# Patient Record
Sex: FEMALE | Race: WHITE | NOT HISPANIC OR LATINO | URBAN - METROPOLITAN AREA
[De-identification: names, ages, dates, MRNs, and addresses within clinical notes are randomized per-mention and may not be internally consistent; named-entity substitution may affect disease eponyms.]

---

## 2024-01-02 ENCOUNTER — LAB REQUISITION (OUTPATIENT)
Dept: LAB | Facility: HOSPITAL | Age: 29
End: 2024-01-02

## 2024-01-02 LAB — SARS-COV-2 RNA RESP QL NAA+PROBE: NOT DETECTED

## 2024-01-02 PROCEDURE — 87635 SARS-COV-2 COVID-19 AMP PRB: CPT

## 2024-01-22 ENCOUNTER — APPOINTMENT (OUTPATIENT)
Dept: PRIMARY CARE | Facility: CLINIC | Age: 29
End: 2024-01-22
Payer: COMMERCIAL

## 2024-02-09 ENCOUNTER — OFFICE VISIT (OUTPATIENT)
Dept: PRIMARY CARE | Facility: CLINIC | Age: 29
End: 2024-02-09
Payer: COMMERCIAL

## 2024-02-09 VITALS
HEIGHT: 65 IN | BODY MASS INDEX: 28.16 KG/M2 | WEIGHT: 169 LBS | SYSTOLIC BLOOD PRESSURE: 132 MMHG | HEART RATE: 72 BPM | DIASTOLIC BLOOD PRESSURE: 74 MMHG | RESPIRATION RATE: 19 BRPM

## 2024-02-09 DIAGNOSIS — F41.1 GENERALIZED ANXIETY DISORDER: ICD-10-CM

## 2024-02-09 DIAGNOSIS — F42.2 MIXED OBSESSIONAL THOUGHTS AND ACTS: ICD-10-CM

## 2024-02-09 DIAGNOSIS — F39 EPISODIC MOOD DISORDER (CMS-HCC): ICD-10-CM

## 2024-02-09 DIAGNOSIS — Z00.00 HEALTH CARE MAINTENANCE: Primary | ICD-10-CM

## 2024-02-09 DIAGNOSIS — F34.81 DISRUPTIVE MOOD DYSREGULATION DISORDER (MULTI): ICD-10-CM

## 2024-02-09 DIAGNOSIS — F41.1 ANXIETY STATE: ICD-10-CM

## 2024-02-09 DIAGNOSIS — F91.3 OPPOSITIONAL DEFIANT DISORDER: ICD-10-CM

## 2024-02-09 DIAGNOSIS — F32.A DEPRESSIVE DISORDER: ICD-10-CM

## 2024-02-09 PROBLEM — N92.1 METRORRHAGIA: Status: RESOLVED | Noted: 2021-07-01 | Resolved: 2024-02-09

## 2024-02-09 PROBLEM — N92.1 METRORRHAGIA: Status: ACTIVE | Noted: 2021-07-01

## 2024-02-09 PROBLEM — N93.9 ABNORMAL UTERINE BLEEDING (AUB): Status: RESOLVED | Noted: 2020-10-01 | Resolved: 2024-02-09

## 2024-02-09 PROBLEM — N93.9 ABNORMAL UTERINE BLEEDING (AUB): Status: ACTIVE | Noted: 2020-10-01

## 2024-02-09 PROCEDURE — 99385 PREV VISIT NEW AGE 18-39: CPT

## 2024-02-09 RX ORDER — LAMOTRIGINE 200 MG/1
TABLET ORAL
COMMUNITY
Start: 2023-12-14 | End: 2024-03-26 | Stop reason: WASHOUT

## 2024-02-09 RX ORDER — ARMODAFINIL 150 MG/1
1 TABLET ORAL
COMMUNITY
Start: 2023-12-14

## 2024-02-09 RX ORDER — SERTRALINE HYDROCHLORIDE 100 MG/1
1 TABLET, FILM COATED ORAL DAILY
COMMUNITY
Start: 2018-01-09 | End: 2024-02-09 | Stop reason: WASHOUT

## 2024-02-09 RX ORDER — FLUOCINONIDE 0.5 MG/G
CREAM TOPICAL
COMMUNITY
Start: 2015-07-29 | End: 2024-02-09 | Stop reason: WASHOUT

## 2024-02-09 RX ORDER — RISPERIDONE 0.25 MG/1
0.25 TABLET ORAL
COMMUNITY
Start: 2018-01-09 | End: 2024-02-09 | Stop reason: WASHOUT

## 2024-02-09 RX ORDER — LAMOTRIGINE 150 MG/1
1 TABLET ORAL DAILY
COMMUNITY
Start: 2023-05-05 | End: 2024-02-09 | Stop reason: WASHOUT

## 2024-02-09 RX ORDER — SERTRALINE HYDROCHLORIDE 25 MG/1
1 TABLET, FILM COATED ORAL DAILY
COMMUNITY
Start: 2018-01-09 | End: 2024-02-09 | Stop reason: WASHOUT

## 2024-02-09 RX ORDER — HYDROXYZINE HYDROCHLORIDE 25 MG/1
TABLET, FILM COATED ORAL
COMMUNITY
Start: 2023-02-21 | End: 2024-02-09 | Stop reason: WASHOUT

## 2024-02-09 RX ORDER — LORAZEPAM 0.5 MG/1
TABLET ORAL
COMMUNITY
Start: 2023-03-29 | End: 2024-02-09 | Stop reason: WASHOUT

## 2024-02-09 ASSESSMENT — ENCOUNTER SYMPTOMS
NUMBNESS: 0
DYSURIA: 0
FREQUENCY: 0
LIGHT-HEADEDNESS: 0
HEADACHES: 0
APNEA: 0
CONSTITUTIONAL NEGATIVE: 1
BRUISES/BLEEDS EASILY: 0
COLOR CHANGE: 0
GASTROINTESTINAL NEGATIVE: 1
DIAPHORESIS: 0
CONFUSION: 0
NECK STIFFNESS: 0
NERVOUS/ANXIOUS: 1
MUSCULOSKELETAL NEGATIVE: 1
JOINT SWELLING: 0
DECREASED CONCENTRATION: 0
RESPIRATORY NEGATIVE: 1
APPETITE CHANGE: 0
SLEEP DISTURBANCE: 0
ENDOCRINE NEGATIVE: 1
FLANK PAIN: 0
BACK PAIN: 0
FATIGUE: 0
DIARRHEA: 0
RECTAL PAIN: 0
TROUBLE SWALLOWING: 0
VOICE CHANGE: 0
WHEEZING: 0
COUGH: 0
HALLUCINATIONS: 0
SORE THROAT: 0
RHINORRHEA: 0
FEVER: 0
AGITATION: 0
NEUROLOGICAL NEGATIVE: 1
EYES NEGATIVE: 1
EYE DISCHARGE: 0
SEIZURES: 0
CHILLS: 0
HEMATURIA: 0
ACTIVITY CHANGE: 0
PHOTOPHOBIA: 0
WEAKNESS: 0
PALPITATIONS: 0
DYSPHORIC MOOD: 0
SHORTNESS OF BREATH: 0
STRIDOR: 0
POLYDIPSIA: 0
HYPERACTIVE: 0
SINUS PRESSURE: 0
CONSTIPATION: 0
ABDOMINAL DISTENTION: 0
NAUSEA: 0
SPEECH DIFFICULTY: 0
NECK PAIN: 0
POLYPHAGIA: 0
MYALGIAS: 0
DIZZINESS: 0
UNEXPECTED WEIGHT CHANGE: 0
ANAL BLEEDING: 0
BLOOD IN STOOL: 0
TREMORS: 0
HEMATOLOGIC/LYMPHATIC NEGATIVE: 1
ABDOMINAL PAIN: 0
CARDIOVASCULAR NEGATIVE: 1
CHEST TIGHTNESS: 0
DIFFICULTY URINATING: 0

## 2024-02-09 NOTE — PROGRESS NOTES
Primary Care Provider: Jenn Guzman, APRN-CNP    Subjective   Carri Morenita Qureshi is a 28 y.o. female who presents for Establish Care.    New patient visit/establish care/CPE    Past medical history of oppositional defiant disorder, generalized anxiety, episodic mood disorder, depressive disorder, mixed obsessive thoughts and acts, disruptive mood dysregulation disorder.    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 She denies any symptoms today however she is very tearful and crying throughout the visit today as she states she has not slept in is very upset about the situation at work.  She states she has been stable and feeling well on her current medication regimen.    HM:  Needs to get back with regular dentist visits  Wears glasses, up-to-date on eye exam  Well-balanced diet  Exercising regularly  Per patient last Pap was this past August, 2023 and per patient was within normal limits-this was at a private practice  Mammogram due at age 42 unless otherwise indicated  Colonoscopy due at age 47 unless otherwise indicated  Immunizations: Tdap due  No tobacco use    No chest pain, no shortness of breath, bowel movements regular, no trouble urinating, energy level is good         Review of Systems   Constitutional: Negative.  Negative for activity change, appetite change, chills, diaphoresis, fatigue, fever and unexpected weight change.  "  HENT: Negative.  Negative for congestion, dental problem, ear discharge, ear pain, hearing loss, mouth sores, nosebleeds, postnasal drip, rhinorrhea, sinus pressure, sneezing, sore throat, tinnitus, trouble swallowing and voice change.    Eyes: Negative.  Negative for photophobia, discharge and visual disturbance.   Respiratory: Negative.  Negative for apnea, cough, chest tightness, shortness of breath, wheezing and stridor.    Cardiovascular: Negative.  Negative for chest pain, palpitations and leg swelling.   Gastrointestinal: Negative.  Negative for abdominal distention, abdominal pain, anal bleeding, blood in stool, constipation, diarrhea, nausea and rectal pain.   Endocrine: Negative.  Negative for cold intolerance, heat intolerance, polydipsia, polyphagia and polyuria.   Genitourinary: Negative.  Negative for decreased urine volume, difficulty urinating, dysuria, flank pain, frequency, hematuria and urgency.   Musculoskeletal: Negative.  Negative for back pain, gait problem, joint swelling, myalgias, neck pain and neck stiffness.   Skin: Negative.  Negative for color change and rash.   Neurological: Negative.  Negative for dizziness, tremors, seizures, syncope, speech difficulty, weakness, light-headedness, numbness and headaches.   Hematological: Negative.  Does not bruise/bleed easily.   Psychiatric/Behavioral:  Negative for agitation, behavioral problems, confusion, decreased concentration, dysphoric mood, hallucinations, self-injury, sleep disturbance and suicidal ideas. The patient is nervous/anxious. The patient is not hyperactive.    All other systems reviewed and are negative.        Objective   /74   Pulse 72   Resp 19   Ht 1.651 m (5' 5\")   Wt 76.7 kg (169 lb)   BMI 28.12 kg/m²     Physical Exam  Vitals reviewed.   Constitutional:       General: She is not in acute distress.     Appearance: Normal appearance. She is normal weight. She is not ill-appearing, toxic-appearing or diaphoretic. "   HENT:      Head: Normocephalic and atraumatic.      Nose: Nose normal.   Eyes:      Conjunctiva/sclera: Conjunctivae normal.   Cardiovascular:      Rate and Rhythm: Normal rate and regular rhythm.      Pulses: Normal pulses.      Heart sounds: Normal heart sounds. No murmur heard.     No friction rub. No gallop.   Pulmonary:      Effort: Pulmonary effort is normal. No respiratory distress.      Breath sounds: Normal breath sounds.   Abdominal:      General: Abdomen is flat. Bowel sounds are normal.      Palpations: Abdomen is soft.   Musculoskeletal:         General: Normal range of motion.      Cervical back: Normal range of motion and neck supple.   Lymphadenopathy:      Cervical: No cervical adenopathy.   Skin:     General: Skin is warm and dry.      Capillary Refill: Capillary refill takes less than 2 seconds.   Neurological:      General: No focal deficit present.      Mental Status: She is alert and oriented to person, place, and time. Mental status is at baseline.   Psychiatric:         Mood and Affect: Mood normal.         Behavior: Behavior normal.         Thought Content: Thought content normal.         Judgment: Judgment normal.      Comments: Tearful/crying         Assessment/Plan   Problem List Items Addressed This Visit       Oppositional defiant disorder    Relevant Orders    Referral to Psychiatry    Referral to Psychology    Lipid Panel    CBC    Comprehensive metabolic panel    Vitamin D 25-Hydroxy,Total (for eval of Vitamin D levels)    TSH with reflex to Free T4 if abnormal    Mixed obsessional thoughts and acts    Relevant Orders    Referral to Psychiatry    Referral to Psychology    Lipid Panel    CBC    Comprehensive metabolic panel    Vitamin D 25-Hydroxy,Total (for eval of Vitamin D levels)    TSH with reflex to Free T4 if abnormal    Generalized anxiety disorder    Relevant Orders    Referral to Psychiatry    Referral to Psychology    Lipid Panel    CBC    Comprehensive metabolic panel     Vitamin D 25-Hydroxy,Total (for eval of Vitamin D levels)    TSH with reflex to Free T4 if abnormal    Episodic mood disorder (CMS/HCC)    Relevant Orders    Referral to Psychiatry    Referral to Psychology    Lipid Panel    CBC    Comprehensive metabolic panel    Vitamin D 25-Hydroxy,Total (for eval of Vitamin D levels)    TSH with reflex to Free T4 if abnormal    Disruptive mood dysregulation disorder (CMS/HCC)    Relevant Orders    Referral to Psychiatry    Referral to Psychology    Lipid Panel    CBC    Comprehensive metabolic panel    Vitamin D 25-Hydroxy,Total (for eval of Vitamin D levels)    TSH with reflex to Free T4 if abnormal    Depressive disorder    Relevant Orders    Referral to Psychiatry    Referral to Psychology    Lipid Panel    CBC    Comprehensive metabolic panel    Vitamin D 25-Hydroxy,Total (for eval of Vitamin D levels)    TSH with reflex to Free T4 if abnormal    Anxiety state    Relevant Orders    Referral to Psychiatry    Referral to Psychology    Lipid Panel    CBC    Comprehensive metabolic panel    Vitamin D 25-Hydroxy,Total (for eval of Vitamin D levels)    TSH with reflex to Free T4 if abnormal     Other Visit Diagnoses       Health care maintenance    -  Primary    Relevant Orders    Lipid Panel    CBC    Comprehensive metabolic panel    Vitamin D 25-Hydroxy,Total (for eval of Vitamin D levels)    TSH with reflex to Free T4 if abnormal          New patient visit/establish care/CPE    Was following with psychiatry at 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  She denies any symptoms today however she is very tearful and crying throughout the visit today as she states she has not slept in is very upset about the situation at work.      Follow-up with annual wellness exam and as needed

## 2024-03-04 ENCOUNTER — APPOINTMENT (OUTPATIENT)
Dept: BEHAVIORAL HEALTH | Facility: CLINIC | Age: 29
End: 2024-03-04
Payer: COMMERCIAL

## 2024-03-25 ENCOUNTER — TELEMEDICINE (OUTPATIENT)
Dept: BEHAVIORAL HEALTH | Facility: CLINIC | Age: 29
End: 2024-03-25
Payer: COMMERCIAL

## 2024-03-25 DIAGNOSIS — F34.81 DISRUPTIVE MOOD DYSREGULATION DISORDER (MULTI): ICD-10-CM

## 2024-03-25 DIAGNOSIS — F91.3 OPPOSITIONAL DEFIANT DISORDER: ICD-10-CM

## 2024-03-25 DIAGNOSIS — F32.A DEPRESSIVE DISORDER: ICD-10-CM

## 2024-03-25 DIAGNOSIS — F39 EPISODIC MOOD DISORDER (CMS-HCC): ICD-10-CM

## 2024-03-25 DIAGNOSIS — F41.1 ANXIETY STATE: ICD-10-CM

## 2024-03-25 DIAGNOSIS — F41.1 GENERALIZED ANXIETY DISORDER: ICD-10-CM

## 2024-03-25 DIAGNOSIS — F42.2 MIXED OBSESSIONAL THOUGHTS AND ACTS: ICD-10-CM

## 2024-03-25 PROCEDURE — 99205 OFFICE O/P NEW HI 60 MIN: CPT

## 2024-03-25 RX ORDER — FLUVOXAMINE MALEATE 100 MG/1
100 TABLET, COATED ORAL NIGHTLY
Qty: 90 TABLET | Refills: 0 | Status: SHIPPED | OUTPATIENT
Start: 2024-03-25 | End: 2025-03-25

## 2024-03-25 NOTE — PROGRESS NOTES
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 She has a hx of MDD and OCD. She presents today to establish a relationship with a new provider and continue medication management.         HPI   She  was dx with bipolar due to experiencing periods being very goal oriented and her sex drive can increasing but at this time she does not exhibit or does she remember any other symptoms that can span over a four day period.   She is currently prescribed lamotrigine 300 mg and Vraylar 1.5 mg and she states that she has yet to experience a hypomanic state but does not feels that she decreased with obsessive thoughts or helped with depression. She visits her therapist once a month.  She feels that she has obsessive thoughts. She may replay conversations in her head consistently to dissect a conversation and feels that she is always wondering what someone is thinking of her, therefore she feels that she can not live her life. The thought process is draining asa result she lacks energy because she has constant thoughts of what if and what has happened. She feels that sleep is delayed d/t consistent thoughts. She admits to anxiety consistently about the unknown. When she is anxious her skin crawls, she feels restless, mind and heart races.     Mood:   Sleep/Energy/Motivation: 7hrs/ low/moderate  Appetite/Weight Changes: normal/stable  Psychosis: denies  SI/HI: denies    PSYCH HISTORY  Past Psych Hx dx: odd/mdd/ocd/juan  Past Psych Hospitalization: 10/2022 d/t ER panic attacks and depressive   Current psych meds:   Vraylar 1.5 mg- became fatigue   Lamotrigine 300 mg - became very fatigue   Past psych meds:  Sertraline 125 mg -ineffective  Risperidone - induce wt gain and fatigue       Review of Systems   Constitutional: Negative.    HENT: Negative.     Eyes: Negative.    Respiratory: Negative.         Objective   Physical Exam  Psychiatric:         Attention and Perception: Attention normal.         Mood and Affect:  Mood normal.         Speech: Speech normal.         Behavior: Behavior is cooperative.         Cognition and Memory: Cognition normal.         Judgment: Judgment normal.     Acute risk of self-harm remains low despite current stressors (acute and chronic). No reported thoughts of self-harm plan, or intent. She is future-oriented, feels responsibility for her family, and has no hx of SA or hospitalizations.      Assessment/Plan   Carri Qureshi is a 29 y/o CF who has a past dx of bipolar but at the time she does not meet criteria for the disorder. She admits that she has times were her goals increase and her sex drive peaks but she states that it does not last four days nor does she exhibit any other symptoms such as pressure speech, increase self-esteem, impulsivity, distractibility, or decreased sleep but exhibiting more energy. But she was prescribed Cariprazine 1.5 mg and lamotrigine 200 mg she denies experiencing a hypomanic state but she states that she remains depressed with extreme OCD symptoms as a result she is willing to discontinue the medications and trial fluvoxamine 100 mg.    Initiate fluvoxamine 100 mg to target OCD symptoms.  Discontinue the use of cariprazine 1.5 mg.  Discontinue the use of lamotrigine 200 mg.   Please seek therapy by utilizing [employer] Today website.  Please download unwinding anxiety to learn how to resolve anxiety.  Provided with crisis/emergency resources, including the [facility] Crisis Hotline 254-155-8250, Crisis Text Line (text 1MEYF af 543986) and the National Suicide Prevention Lifeline hotline 1-443.694.4197. Agrees to call 911 or go to the nearest emergency department if s/he feels unsafe, or has suicidal thinking with a plan or intent.  Next appointment:- 6 weeks

## 2024-03-26 ASSESSMENT — ENCOUNTER SYMPTOMS
EYES NEGATIVE: 1
RESPIRATORY NEGATIVE: 1
CONSTITUTIONAL NEGATIVE: 1

## 2024-05-13 ENCOUNTER — TELEMEDICINE (OUTPATIENT)
Dept: BEHAVIORAL HEALTH | Facility: CLINIC | Age: 29
End: 2024-05-13
Payer: COMMERCIAL

## 2024-05-13 DIAGNOSIS — F41.1 GENERALIZED ANXIETY DISORDER: ICD-10-CM

## 2024-05-13 PROCEDURE — 99213 OFFICE O/P EST LOW 20 MIN: CPT

## 2024-05-13 RX ORDER — ESCITALOPRAM OXALATE 10 MG/1
10 TABLET ORAL DAILY
Qty: 90 TABLET | Refills: 0 | Status: SHIPPED | OUTPATIENT
Start: 2024-05-13 | End: 2024-08-11

## 2024-05-13 NOTE — PROGRESS NOTES
Subjective   Patient ID: Carri Qureshi is a 28 y.o. female who presents for MDD and OCD.     HPI  With the use of fluvoxamine 100 mg OCD thoughts have decrease by 30% her mind can sometimes be relaxed but notice that her mood has decreased. She also notice constipation,dry mouth, and nausea.  She finds herself crying randomly w/o triggers and has become more fatigue. She has social anxiety she worries about what others could be thinking, crowds induce anxiety therefore she avoids malls and grocery stores.    Review of Systems   Constitutional: Negative.    HENT: Negative.     Eyes: Negative.    Respiratory: Negative.         Past psych meds:  Sertraline 125 mg -ineffective  Risperidone - induce wt gain and fatigue   Vraylar 1.5 mg- became fatigue   Lamotrigine 300 mg - became very fatigue     Objective   Physical Exam  Psychiatric:         Attention and Perception: Attention normal.         Mood and Affect: Mood is depressed.         Speech: Speech normal.         Behavior: Behavior is cooperative.         Cognition and Memory: Cognition normal.         Judgment: Judgment normal.               Assessment/Plan   Carri Qureshi is a 27 y/o CF who has a past dx of bipolar but at the time she does not meet criteria for the disorder. She admits that she has times were her goals increase and her sex drive peaks but she states that it does not last four days nor does she exhibit any other symptoms such as pressure speech, increase self-esteem, impulsivity, distractibility, or decreased sleep but exhibiting more energy. With the use of fluoxamine 100 mg she experience constipation and an decrease mood as a result she is willing to switch to escitalopram 10 mg to target depression and OCD.     Discontinue fluvoxamine 100 mg to target OCD symptoms.  Initiate escitalopram 5 mg  for 14 days then increase to 10 mg if you are free of side effects.   Please seek therapy by utilizing [employer] website.  Please download  unwinding anxiety to learn how to resolve anxiety.  Provided with crisis/emergency resources, including the [facility] Crisis Hotline 169-438-7396, Crisis Text Line (text 7VSKJ yy 140729) and the National Suicide Prevention Lifeline hotline 1-984.940.9179. Agrees to call 911 or go to the nearest emergency department if s/he feels unsafe, or has suicidal thinking with a plan or intent.  Next appointment:7/19 at 10:00 (v)

## 2024-05-14 ASSESSMENT — ENCOUNTER SYMPTOMS
EYES NEGATIVE: 1
RESPIRATORY NEGATIVE: 1
CONSTITUTIONAL NEGATIVE: 1

## 2024-07-19 ENCOUNTER — APPOINTMENT (OUTPATIENT)
Dept: BEHAVIORAL HEALTH | Facility: CLINIC | Age: 29
End: 2024-07-19
Payer: COMMERCIAL

## 2024-08-23 ENCOUNTER — LAB (OUTPATIENT)
Dept: LAB | Facility: LAB | Age: 29
End: 2024-08-23
Payer: COMMERCIAL

## 2024-08-23 LAB — COTININE UR QL SCN: NEGATIVE
